# Patient Record
Sex: FEMALE | Race: WHITE | NOT HISPANIC OR LATINO | Employment: OTHER | URBAN - NONMETROPOLITAN AREA
[De-identification: names, ages, dates, MRNs, and addresses within clinical notes are randomized per-mention and may not be internally consistent; named-entity substitution may affect disease eponyms.]

---

## 2017-01-05 ENCOUNTER — OFFICE VISIT (OUTPATIENT)
Dept: RADIATION ONCOLOGY | Facility: HOSPITAL | Age: 82
End: 2017-01-05

## 2017-01-05 ENCOUNTER — HOSPITAL ENCOUNTER (OUTPATIENT)
Dept: RADIATION ONCOLOGY | Facility: HOSPITAL | Age: 82
Setting detail: RADIATION/ONCOLOGY SERIES
End: 2017-01-05

## 2017-01-05 VITALS
WEIGHT: 112 LBS | BODY MASS INDEX: 19.84 KG/M2 | HEIGHT: 63 IN | DIASTOLIC BLOOD PRESSURE: 70 MMHG | SYSTOLIC BLOOD PRESSURE: 140 MMHG

## 2017-01-05 DIAGNOSIS — C50.211 MALIGNANT NEOPLASM OF UPPER-INNER QUADRANT OF RIGHT FEMALE BREAST (HCC): Primary | ICD-10-CM

## 2017-01-05 PROCEDURE — 77290 THER RAD SIMULAJ FIELD CPLX: CPT | Performed by: RADIOLOGY

## 2017-01-05 PROCEDURE — 77334 RADIATION TREATMENT AID(S): CPT | Performed by: RADIOLOGY

## 2017-01-05 RX ORDER — TRIAMTERENE AND HYDROCHLOROTHIAZIDE 37.5; 25 MG/1; MG/1
1 TABLET ORAL DAILY
Refills: 5 | COMMUNITY
Start: 2016-12-20

## 2017-01-12 ENCOUNTER — OFFICE VISIT (OUTPATIENT)
Dept: SURGERY | Age: 82
End: 2017-01-12

## 2017-01-12 VITALS — DIASTOLIC BLOOD PRESSURE: 78 MMHG | SYSTOLIC BLOOD PRESSURE: 130 MMHG | HEART RATE: 72 BPM

## 2017-01-12 DIAGNOSIS — Z98.890 STATUS POST RIGHT BREAST LUMPECTOMY: ICD-10-CM

## 2017-01-12 DIAGNOSIS — C50.211 MALIGNANT NEOPLASM OF UPPER-INNER QUADRANT OF RIGHT FEMALE BREAST (HCC): Primary | ICD-10-CM

## 2017-01-12 PROCEDURE — 99024 POSTOP FOLLOW-UP VISIT: CPT | Performed by: SURGERY

## 2017-01-16 PROCEDURE — 77334 RADIATION TREATMENT AID(S): CPT

## 2017-01-16 PROCEDURE — 77300 RADIATION THERAPY DOSE PLAN: CPT | Performed by: RADIOLOGY

## 2017-01-16 PROCEDURE — 77295 3-D RADIOTHERAPY PLAN: CPT | Performed by: RADIOLOGY

## 2017-01-19 ENCOUNTER — HOSPITAL ENCOUNTER (OUTPATIENT)
Dept: RADIATION ONCOLOGY | Facility: HOSPITAL | Age: 82
Discharge: HOME | End: 2017-01-19

## 2017-01-19 PROCEDURE — 77280 THER RAD SIMULAJ FIELD SMPL: CPT | Performed by: RADIOLOGY

## 2017-01-19 PROCEDURE — 77412 RADIATION TX DELIVERY LVL 3: CPT | Performed by: RADIOLOGY

## 2017-01-20 ENCOUNTER — HOSPITAL ENCOUNTER (OUTPATIENT)
Dept: RADIATION ONCOLOGY | Facility: HOSPITAL | Age: 82
Discharge: HOME | End: 2017-01-20

## 2017-01-20 PROCEDURE — 77417 THER RADIOLOGY PORT IMAGE(S): CPT | Performed by: RADIOLOGY

## 2017-01-20 PROCEDURE — 77412 RADIATION TX DELIVERY LVL 3: CPT | Performed by: RADIOLOGY

## 2017-01-23 ENCOUNTER — HOSPITAL ENCOUNTER (OUTPATIENT)
Dept: RADIATION ONCOLOGY | Facility: HOSPITAL | Age: 82
Setting detail: RADIATION/ONCOLOGY SERIES
Discharge: HOME | End: 2017-01-23

## 2017-01-23 PROCEDURE — 77412 RADIATION TX DELIVERY LVL 3: CPT | Performed by: RADIOLOGY

## 2017-01-24 ENCOUNTER — HOSPITAL ENCOUNTER (OUTPATIENT)
Dept: RADIATION ONCOLOGY | Facility: HOSPITAL | Age: 82
Setting detail: RADIATION/ONCOLOGY SERIES
Discharge: HOME | End: 2017-01-24

## 2017-01-24 PROCEDURE — 77412 RADIATION TX DELIVERY LVL 3: CPT | Performed by: RADIOLOGY

## 2017-01-25 ENCOUNTER — HOSPITAL ENCOUNTER (OUTPATIENT)
Dept: RADIATION ONCOLOGY | Facility: HOSPITAL | Age: 82
Discharge: HOME | End: 2017-01-25

## 2017-01-25 PROCEDURE — 77336 RADIATION PHYSICS CONSULT: CPT | Performed by: RADIOLOGY

## 2017-01-25 PROCEDURE — 77412 RADIATION TX DELIVERY LVL 3: CPT | Performed by: RADIOLOGY

## 2017-01-26 ENCOUNTER — HOSPITAL ENCOUNTER (OUTPATIENT)
Dept: RADIATION ONCOLOGY | Facility: HOSPITAL | Age: 82
Discharge: HOME | End: 2017-01-26

## 2017-01-26 PROCEDURE — 77412 RADIATION TX DELIVERY LVL 3: CPT | Performed by: RADIOLOGY

## 2017-01-27 ENCOUNTER — HOSPITAL ENCOUNTER (OUTPATIENT)
Dept: RADIATION ONCOLOGY | Facility: HOSPITAL | Age: 82
Setting detail: RADIATION/ONCOLOGY SERIES
Discharge: HOME | End: 2017-01-27

## 2017-01-27 PROCEDURE — 77412 RADIATION TX DELIVERY LVL 3: CPT | Performed by: RADIOLOGY

## 2017-01-27 PROCEDURE — 77417 THER RADIOLOGY PORT IMAGE(S): CPT | Performed by: RADIOLOGY

## 2017-01-30 ENCOUNTER — HOSPITAL ENCOUNTER (OUTPATIENT)
Dept: RADIATION ONCOLOGY | Facility: HOSPITAL | Age: 82
Discharge: HOME | End: 2017-01-30

## 2017-01-30 PROCEDURE — 77412 RADIATION TX DELIVERY LVL 3: CPT | Performed by: RADIOLOGY

## 2017-01-31 ENCOUNTER — HOSPITAL ENCOUNTER (OUTPATIENT)
Dept: RADIATION ONCOLOGY | Facility: HOSPITAL | Age: 82
Discharge: HOME | End: 2017-01-31

## 2017-01-31 PROCEDURE — 77412 RADIATION TX DELIVERY LVL 3: CPT | Performed by: RADIOLOGY

## 2017-02-01 ENCOUNTER — HOSPITAL ENCOUNTER (OUTPATIENT)
Dept: RADIATION ONCOLOGY | Facility: HOSPITAL | Age: 82
Discharge: HOME | End: 2017-02-01

## 2017-02-01 PROCEDURE — 77336 RADIATION PHYSICS CONSULT: CPT | Performed by: RADIOLOGY

## 2017-02-01 PROCEDURE — 77412 RADIATION TX DELIVERY LVL 3: CPT | Performed by: RADIOLOGY

## 2017-02-02 ENCOUNTER — HOSPITAL ENCOUNTER (OUTPATIENT)
Dept: RADIATION ONCOLOGY | Facility: HOSPITAL | Age: 82
Discharge: HOME | End: 2017-02-02

## 2017-02-02 PROCEDURE — 77412 RADIATION TX DELIVERY LVL 3: CPT | Performed by: RADIOLOGY

## 2017-02-03 ENCOUNTER — HOSPITAL ENCOUNTER (OUTPATIENT)
Dept: RADIATION ONCOLOGY | Facility: HOSPITAL | Age: 82
Setting detail: RADIATION/ONCOLOGY SERIES
Discharge: HOME | End: 2017-02-03

## 2017-02-03 PROCEDURE — 77412 RADIATION TX DELIVERY LVL 3: CPT | Performed by: RADIOLOGY

## 2017-02-03 PROCEDURE — 77417 THER RADIOLOGY PORT IMAGE(S): CPT | Performed by: RADIOLOGY

## 2017-02-06 ENCOUNTER — HOSPITAL ENCOUNTER (OUTPATIENT)
Dept: RADIATION ONCOLOGY | Facility: HOSPITAL | Age: 82
Setting detail: RADIATION/ONCOLOGY SERIES
End: 2017-02-06

## 2017-02-06 ENCOUNTER — HOSPITAL ENCOUNTER (OUTPATIENT)
Dept: RADIATION ONCOLOGY | Facility: HOSPITAL | Age: 82
Discharge: HOME | End: 2017-02-06

## 2017-02-06 PROCEDURE — 77412 RADIATION TX DELIVERY LVL 3: CPT | Performed by: RADIOLOGY

## 2017-02-07 ENCOUNTER — HOSPITAL ENCOUNTER (OUTPATIENT)
Dept: RADIATION ONCOLOGY | Facility: HOSPITAL | Age: 82
Setting detail: RADIATION/ONCOLOGY SERIES
Discharge: HOME | End: 2017-02-07

## 2017-02-07 PROCEDURE — 77412 RADIATION TX DELIVERY LVL 3: CPT | Performed by: RADIOLOGY

## 2017-02-08 ENCOUNTER — HOSPITAL ENCOUNTER (OUTPATIENT)
Dept: RADIATION ONCOLOGY | Facility: HOSPITAL | Age: 82
Setting detail: RADIATION/ONCOLOGY SERIES
Discharge: HOME | End: 2017-02-08

## 2017-02-08 PROCEDURE — 77412 RADIATION TX DELIVERY LVL 3: CPT | Performed by: RADIOLOGY

## 2017-02-08 PROCEDURE — 77336 RADIATION PHYSICS CONSULT: CPT | Performed by: RADIOLOGY

## 2017-02-09 ENCOUNTER — HOSPITAL ENCOUNTER (OUTPATIENT)
Dept: RADIATION ONCOLOGY | Facility: HOSPITAL | Age: 82
Setting detail: RADIATION/ONCOLOGY SERIES
Discharge: HOME | End: 2017-02-09

## 2017-02-09 PROCEDURE — 77412 RADIATION TX DELIVERY LVL 3: CPT | Performed by: RADIOLOGY

## 2017-03-01 ENCOUNTER — OFFICE VISIT (OUTPATIENT)
Dept: SURGERY | Age: 82
End: 2017-03-01

## 2017-03-01 VITALS — SYSTOLIC BLOOD PRESSURE: 120 MMHG | DIASTOLIC BLOOD PRESSURE: 80 MMHG | HEART RATE: 84 BPM

## 2017-03-01 DIAGNOSIS — C50.211 BREAST CANCER OF UPPER-INNER QUADRANT OF RIGHT FEMALE BREAST (HCC): Primary | ICD-10-CM

## 2017-03-01 DIAGNOSIS — Z98.890 STATUS POST RIGHT BREAST LUMPECTOMY: ICD-10-CM

## 2017-03-01 PROCEDURE — G8400 PT W/DXA NO RESULTS DOC: HCPCS | Performed by: SURGERY

## 2017-03-01 PROCEDURE — 1123F ACP DISCUSS/DSCN MKR DOCD: CPT | Performed by: SURGERY

## 2017-03-01 PROCEDURE — G8419 CALC BMI OUT NRM PARAM NOF/U: HCPCS | Performed by: SURGERY

## 2017-03-01 PROCEDURE — G8484 FLU IMMUNIZE NO ADMIN: HCPCS | Performed by: SURGERY

## 2017-03-01 PROCEDURE — 99024 POSTOP FOLLOW-UP VISIT: CPT | Performed by: SURGERY

## 2017-03-01 PROCEDURE — 1090F PRES/ABSN URINE INCON ASSESS: CPT | Performed by: SURGERY

## 2017-03-01 PROCEDURE — 1036F TOBACCO NON-USER: CPT | Performed by: SURGERY

## 2017-03-01 PROCEDURE — G8428 CUR MEDS NOT DOCUMENT: HCPCS | Performed by: SURGERY

## 2017-03-01 PROCEDURE — 4040F PNEUMOC VAC/ADMIN/RCVD: CPT | Performed by: SURGERY

## 2017-03-01 RX ORDER — TAMOXIFEN CITRATE 20 MG/1
TABLET ORAL
Refills: 3 | COMMUNITY
Start: 2017-01-16 | End: 2019-08-19 | Stop reason: SDUPTHER

## 2017-03-06 ENCOUNTER — HOSPITAL ENCOUNTER (OUTPATIENT)
Dept: RADIATION ONCOLOGY | Facility: HOSPITAL | Age: 82
Setting detail: RADIATION/ONCOLOGY SERIES
End: 2017-03-06

## 2017-03-23 ENCOUNTER — OFFICE VISIT (OUTPATIENT)
Dept: RADIATION ONCOLOGY | Facility: HOSPITAL | Age: 82
End: 2017-03-23

## 2017-03-23 VITALS
HEIGHT: 63 IN | WEIGHT: 111 LBS | DIASTOLIC BLOOD PRESSURE: 74 MMHG | SYSTOLIC BLOOD PRESSURE: 136 MMHG | BODY MASS INDEX: 19.67 KG/M2

## 2017-03-23 DIAGNOSIS — Z08 ENCOUNTER FOR FOLLOW-UP EXAMINATION AFTER COMPLETED TREATMENT FOR CANCER: ICD-10-CM

## 2017-03-23 DIAGNOSIS — C50.211 MALIGNANT NEOPLASM OF UPPER-INNER QUADRANT OF RIGHT FEMALE BREAST (HCC): Primary | ICD-10-CM

## 2017-03-23 DIAGNOSIS — Z92.3 HISTORY OF RADIATION THERAPY: ICD-10-CM

## 2017-03-23 DIAGNOSIS — Z98.890 S/P LUMPECTOMY, RIGHT BREAST: ICD-10-CM

## 2017-03-23 RX ORDER — TAMOXIFEN CITRATE 20 MG/1
20 TABLET ORAL DAILY
COMMUNITY

## 2017-04-03 ENCOUNTER — TELEPHONE (OUTPATIENT)
Dept: SURGERY | Age: 82
End: 2017-04-03

## 2017-05-31 ENCOUNTER — OFFICE VISIT (OUTPATIENT)
Dept: SURGERY | Age: 82
End: 2017-05-31
Payer: MEDICARE

## 2017-05-31 ENCOUNTER — HOSPITAL ENCOUNTER (OUTPATIENT)
Dept: WOMENS IMAGING | Age: 82
Discharge: HOME | End: 2017-05-31
Payer: MEDICARE

## 2017-05-31 VITALS
HEIGHT: 62 IN | SYSTOLIC BLOOD PRESSURE: 120 MMHG | DIASTOLIC BLOOD PRESSURE: 70 MMHG | HEART RATE: 72 BPM | BODY MASS INDEX: 20.24 KG/M2 | WEIGHT: 110 LBS

## 2017-05-31 DIAGNOSIS — C50.211 BREAST CANCER OF UPPER-INNER QUADRANT OF RIGHT FEMALE BREAST (HCC): ICD-10-CM

## 2017-05-31 DIAGNOSIS — Z98.890 STATUS POST RIGHT BREAST LUMPECTOMY: Primary | ICD-10-CM

## 2017-05-31 DIAGNOSIS — Z98.890 STATUS POST RIGHT BREAST LUMPECTOMY: ICD-10-CM

## 2017-05-31 PROCEDURE — 1090F PRES/ABSN URINE INCON ASSESS: CPT | Performed by: SURGERY

## 2017-05-31 PROCEDURE — G8419 CALC BMI OUT NRM PARAM NOF/U: HCPCS | Performed by: SURGERY

## 2017-05-31 PROCEDURE — G8400 PT W/DXA NO RESULTS DOC: HCPCS | Performed by: SURGERY

## 2017-05-31 PROCEDURE — G8427 DOCREV CUR MEDS BY ELIG CLIN: HCPCS | Performed by: SURGERY

## 2017-05-31 PROCEDURE — 1123F ACP DISCUSS/DSCN MKR DOCD: CPT | Performed by: SURGERY

## 2017-05-31 PROCEDURE — 1036F TOBACCO NON-USER: CPT | Performed by: SURGERY

## 2017-05-31 PROCEDURE — 99213 OFFICE O/P EST LOW 20 MIN: CPT | Performed by: SURGERY

## 2017-05-31 PROCEDURE — G0206 DX MAMMO INCL CAD UNI: HCPCS

## 2017-05-31 PROCEDURE — 4040F PNEUMOC VAC/ADMIN/RCVD: CPT | Performed by: SURGERY

## 2017-06-08 DIAGNOSIS — C50.211 BREAST CANCER OF UPPER-INNER QUADRANT OF RIGHT FEMALE BREAST (HCC): Primary | ICD-10-CM

## 2017-06-08 DIAGNOSIS — Z98.890 STATUS POST RIGHT BREAST LUMPECTOMY: ICD-10-CM

## 2017-09-14 ENCOUNTER — TELEPHONE (OUTPATIENT)
Dept: SURGERY | Age: 82
End: 2017-09-14

## 2017-09-27 ENCOUNTER — HOSPITAL ENCOUNTER (OUTPATIENT)
Dept: RADIATION ONCOLOGY | Facility: HOSPITAL | Age: 82
Setting detail: RADIATION/ONCOLOGY SERIES
End: 2017-09-27

## 2017-09-27 ENCOUNTER — OFFICE VISIT (OUTPATIENT)
Dept: RADIATION ONCOLOGY | Facility: HOSPITAL | Age: 82
End: 2017-09-27

## 2017-09-27 VITALS
WEIGHT: 109 LBS | SYSTOLIC BLOOD PRESSURE: 120 MMHG | DIASTOLIC BLOOD PRESSURE: 64 MMHG | HEIGHT: 63 IN | BODY MASS INDEX: 19.31 KG/M2

## 2017-09-27 DIAGNOSIS — Z92.3 HISTORY OF RADIATION THERAPY: ICD-10-CM

## 2017-09-27 DIAGNOSIS — Z08 ENCOUNTER FOR FOLLOW-UP SURVEILLANCE OF BREAST CANCER: ICD-10-CM

## 2017-09-27 DIAGNOSIS — Z85.3 ENCOUNTER FOR FOLLOW-UP SURVEILLANCE OF BREAST CANCER: ICD-10-CM

## 2017-09-27 DIAGNOSIS — C50.211 MALIGNANT NEOPLASM OF UPPER-INNER QUADRANT OF RIGHT FEMALE BREAST (HCC): Primary | ICD-10-CM

## 2017-09-27 DIAGNOSIS — Z98.890 S/P LUMPECTOMY, RIGHT BREAST: ICD-10-CM

## 2017-09-27 DIAGNOSIS — Z17.0 ESTROGEN RECEPTOR POSITIVE: ICD-10-CM

## 2017-09-27 DIAGNOSIS — Z91.89 AT RISK FOR LYMPHEDEMA: ICD-10-CM

## 2017-09-27 PROCEDURE — G0463 HOSPITAL OUTPT CLINIC VISIT: HCPCS | Performed by: RADIOLOGY

## 2017-09-27 NOTE — PROGRESS NOTES
[facility] Emilee Scott BSN, PA-C  ____________________________________________________________  [facility]  Department of Radiation Oncology  [address] [place] [id number]  Office:  [phone]  Fax: [phone]        DATE: 09/27/2017    PATIENT:   Nella Avalos    1934                                 MEDICAL RECORD #:  [id number]                                                       Reason for Visit:    1. Malignant neoplasm of upper-inner quadrant of right female breast    2. S/P lumpectomy, right breast    3. History of radiation therapy- RIght Breast 2/2017    4. Estrogen receptor positive    5. At risk for lymphedema    6. Encounter for follow-up surveillance of breast cancer          BRIEF HISTORY:  Nella Avalos is a very pleasant  82 y.o. female that is status post right breast lumpectomy and radiation therapy for Stage IA (T1c, N0, M0) invasive mammary breast cancer with completion on 02/09/2017. The patient returns to the clinic today for initial follow up evaluation.  The patient is doing well today with no new significant treatment or disease related complaints.  On Tamoxifen and tolerating it well. Mrs. Avalos continues to follow with Dr. Thomason and Dr. Rivero. Pt will see Dr. Rivero again in November with mammogram.  Mammogram completed on 5/31/2017 and was normal.     ONCOLOGY HISTORY      Malignant neoplasm of upper-inner quadrant of right female breast     Initial Diagnosis     Malignant neoplasm of upper-inner quadrant of right female breast         10/28/2016 Imaging     Bilateral Diagnostic Mammogram/Right Breast US:  Patient's palpable abnormality coincides with an oval 1.6cm nodule on ultrasound with internal echoes; this could represent a nonsimple cyst or possibly a solid lesion and further evaluation with aspiration/biopsy should be considered         11/8/2016 Procedure     FINAL DIAGNOSIS:    Breast,  right 2 o'clock mass, biopsy:  Invasive ductal carcinoma, favor intermediate grade  Tumor size at least 11 mm in greatest linear dimension         11/16/2016 Imaging     MRI Bilateral Breasts:  1. A 3.7 cm postop hematoma posteromedial breast with mild edema  changes extending in the pectoralis muscle. Focal peripheral  enhancement associated with this abnormality is described above.  2. No MR evidence of additional breast lesions or definite  lymphadenopathy.         12/2/2016 Surgery     FINAL DIAGNOSIS:    A. Breast, right lumpectomy:   1.  Previous biopsy site identified with residual low-grade ductal  carcinoma in situ.   2.  Negative for evidence of residual invasive carcinoma.   3.  In situ carcinoma extends to within 0.1 cm of the caudal surgical  excision margin.    B.  Breast, reexcision of right breast additional cranial margin: Benign  breast parenchyma.    C.  Lymph nodes, right axillary sentinel lymph node biopsies: 2 lymph  nodes, negative for evidence of malignancy.    AJCC stage:  pT1b, (sn)pN0, pMx (incorporates data from patient's  previous surgical pathology specimen)      INVASIVE CARCINOMA OF THE BREAST: (incorporates data from the patient's  previous surgical pathology specimen)    SPECIMEN:    Procedure:    Excision without wire-guided localization  Lymph Node Sampling:    [place] lymph node(s)  Specimen Laterality:  Right  TUMOR:    Ductal Carcinoma In Situ (DCIS):  DCIS is present.    Negative for extensive intraductal component (EIC).   Architectural Patterns:  Cribriform, Solid   Nuclear Grade:  Grade I (low).  Necrosis:  Not identified  Lobular Carcinoma In Situ (LCIS):  Not identified  Presence of Invasive Carcinoma:  Histologic Type of Invasive Carcinoma:  Invasive ductal carcinoma (no  special type or not otherwise specified).  Histologic Grade: Zoë Histologic Score:    Glandular (Acinar) / Tubular Differentiation:  Score cannot be  determined    Nuclear Pleomorphism:  Score  cannot be determined    Mitotic Rate:   Score cannot be determined    Overall Grade:  Score cannot be determined  EXTENT:    Tumor Size / Focality:    Tumor Size: Size of Largest Invasive Carcinoma:   Greatest dimension of  largest focus of invasion > 0.1 c:  1.1 cm  Tumor Focality : Single focus of invasive carcinoma  Macroscopic and Microscopic Extent of Tumor:    Skin is not present    Skeletal Muscle:  No skeletal muscle is present.  MARGINS:      Margins uninvolved by invasive carcinoma.    Distance from Closest Margin:    Specify:  at least 0.1 cm  Ductal Carcinoma In Situ (DCIS)    Margins uninvolved by DCIS (DCIS present)    Distance of DCIS from Closest Margin:      Specify:  0.1 cm      Closest Uninvolved Margin:  Inferior  ACCESSORY FINDINGS:    Lymph-Vascular Invasion:  Not identified  Dermal Lymph-Vascular Invasion:  No skin present  Treatment Effect: Response to Presurgical (Neoadjuvant) Therapy:    No known presurgical therapy  LYMPH NODES:    [place] and Non-[place] Nodes:    Total Number of Lymph Nodes Examined (sentinel and nonsentinel):  2.     Number of Lymph Nodes without Tumor Cells Identified:  2 .     Number of Lymph Nodes with Isolated Tumor Cells (<= 0.2 mm and <= 200  cells):  0 .  Micro / Macro Metastases:    Not identified  SPECIAL STUDIES:    PATHOLOGIC STAGING:    Primary Tumor (Invasive Carcinoma) (pT):  pT1b:  Tumor > 5 mm but <= 10  mm in greatest dimension  Regional Lymph Nodes (pN):  (sn): Only sentinel node(s) evaluated.  If 6  or more nodes (sentinel or nonsentinel) are removed, this modifier should  not be used.Category (pN):  pN0: No regional lymph node metastasis  identified histologically  Distant Metastasis (pM):  Not applicable  ADDITIONAL NON-TUMOR FINDINGS:         1/19/2017 - 2/9/2017 Radiation     Radiation OncologyTreatment Course:  Nella Avalos received 4256 cGy in 16 fractions to right breast.         5/31/2017 Imaging     Right Mammogram:  1. Postsurgical  changes with no malignant features.  2. This represents a BI-RADS Category 2 exam.  3. Bilateral mammography is recommended in 6 months to place this  patient back on annual schedule.          History obtained from  PATIENT and CHART     PAST MEDICAL HISTORY   Past Medical History:   Diagnosis Date   • Breast cancer    • COPD (chronic obstructive pulmonary disease)    • History of radiation therapy 02/09/2017    4256 cGy to right breast   • Hypertension       PAST SURGICAL HISTORY   Past Surgical History:   Procedure Laterality Date   • BREAST BIOPSY Right 11/08/2016   • BREAST LUMPECTOMY WITH SENTINEL NODE BIOPSY Right 12/05/2016   • HYSTERECTOMY        SOCIAL HISTORY   Social History   Substance Use Topics   • Smoking status: Former Smoker     Types: Cigarettes     Quit date: 2001   • Smokeless tobacco: None   • Alcohol use Yes      Comment: rarely      ALLERGIES   Sulfa antibiotics     MEDICATIONS     Current Outpatient Prescriptions   Medication Sig Dispense Refill   • albuterol (PROAIR HFA) 108 (90 BASE) MCG/ACT inhaler Inhale 2 puffs Every 4 (Four) Hours As Needed for wheezing.     • Biotin 5 MG capsule Take 1 tablet by mouth Daily.     • fluticasone-salmeterol (ADVAIR DISKUS) 250-50 MCG/DOSE DISKUS Inhale 1 puff 2 (Two) Times a Day.     • latanoprost (XALATAN) 0.005 % ophthalmic solution Administer 1 drop to both eyes Every Night.     • Multiple Vitamins-Minerals (MULTIVITAMIN ADULT PO) Take 1 tablet by mouth Daily.     • Potassium 99 MG tablet Take  by mouth.     • tamoxifen (NOLVADEX) 20 MG chemo tablet Take 20 mg by mouth Daily.     • timolol (TIMOPTIC) 0.25 % ophthalmic solution Administer 1 drop to both eyes 2 (Two) Times a Day.     • tiotropium (SPIRIVA HANDIHALER) 18 MCG per inhalation capsule Place 1 capsule into inhaler and inhale Daily.     • triamterene-hydrochlorothiazide (MAXZIDE-25) 37.5-25 MG per tablet Take 1 tablet by mouth Daily. Half a pill once a day  5   • zolpidem (AMBIEN) 5 MG tablet  "Take 5 mg by mouth At Night As Needed for sleep.       No current facility-administered medications for this visit.         The following portions of the patient's history were reviewed and updated as appropriate: allergies, current medications, past family history, past medical history, past social history, past surgical history and problem list.    REVIEW OF SYSTEMS   Review of Systems   HENT: Negative.    Eyes: Negative.         Glaucoma  Cataract surgery left eye     Respiratory: Negative.         COPD   Cardiovascular: Negative.    Gastrointestinal: Negative.    Endocrine:        No hot flashes  Tamoxifen   Genitourinary: Negative.    Musculoskeletal: Negative.    Skin: Negative.    Allergic/Immunologic: Negative.    Hematological: Negative.    Psychiatric/Behavioral: Negative.        PHYSICAL EXAM   VITAL SIGNS:   Vitals:    09/27/17 0935   BP: 120/64   Weight: 109 lb (49.4 kg)   Height: 62.5\" (158.8 cm)   PainSc: 0-No pain   Body mass index is 19.62 kg/(m^2).    General:  Alert and oriented, appears stated age, cooperative and no acute distress. Vitals reviewed.     HEENT:  Normocephalic, atraumatic, conjunctivae/corneas clear. PERRL, EOM's intact. Normal external ear canals bilaterally   Nares normal. Nasal septum midline. No drainage or sinus tenderness.  lips, mucosa, and tongue normal; teeth and gums normal.  Neck supple, no JVD noted. Thyroid ML, not enlarged, no tenderness/mass/nodules      Heart: regular rate and rhythm, S1, S2 normal, no murmur, click, rub or gallop     Lungs: Thoracic dimension normal, lungs clear to auscultation bilaterally     Breasts: right breast non-tender, status post radiation therapy, without mass, skin or nipple changes or axillary nodes, left breast normal without mass, skin or nipple changes or axillary nodes, surgical scars noted right breast, risk and benefit of breast self-exam was discussed.     Abdomen: soft, non-tender; bowel sounds normal; no masses,  no " organomegaly.  No CVA tenderness.     Extremities: ELAINE well, atraumatic, no cyanosis or edema. Pulse 2+ and symmetric.     Skin: Skin color, texture, turgor normal. No rashes or lesions     Lymph nodes: Cervical, supraclavicular, and axillary nodes normal.     Neurologic: Alert and oriented X 3, normal strength and tone. Normal symmetric reflexes. Normal coordination and gait     Psychiatric: Mood and affect are appropriate.    PERTINENT LABS and IMAGING  No results found for any previous visit.      No Images in the past 120 days found..    Clinical Quality Measures             Pain Documented PQRS #131 Pain severity quantified; no pain present, no followup plan required.             Care Plan: ADVANCED CARE PQRS #47 Care plan discussed, no care plan provided             Performance Status: ECOG (1) Restricted in physically strenuous activity, ambulatory and able to do work of light nature             TOBACCO SCREENING AND INTERVENTION  PQRS #226 Screened & identified as tobacco non-user. Former smoker               Medications Documented PQRS #130 Medications documented              WEIGHT SCREENING/BMI  Calculated BMI within normal parameteres & documented     BREAST PQRS #71   Stage IC-IIIC, ER+ or AL+: Yes  HORMONAL TX ()  Pt is taking  STAGE ()  IA  ER/AL Status: ER or AL positive       ASSESSMENT AND PLAN   1. Malignant neoplasm of upper-inner quadrant of right female breast    2. S/P lumpectomy, right breast    3. History of radiation therapy- RIght Breast 2/2017    4. Estrogen receptor positive    5. At risk for lymphedema    6. Encounter for follow-up surveillance of breast cancer       No orders of the defined types were placed in this encounter.      RECOMMENDATIONS:  Nella Avalos completed radiation on 2-9-17 for  Stage IA (T1c, N0, M0) invasive mammary breast cancer and presents to our clinic today without symptoms or evidence on exam for recurrent or metastatic disease at this time.   Return in about 1 year (around 9/27/2018) for Office Visit, re-evaluation and examination.  Patient Instructions   1. Continue Tamoxifen  2. Keep appts with Dr. Thomason and Dr Rivero  3. Return to see Dr. Sharma in one year  4. Don't forget your monthly breast exams  5. Please call us if you need us.         Today, total time spent with this patient was 20 minutes and of that time, well over 50% was spent in counseling and coordination of care as follows: diagnosis, post-treatment coordination of care and radiation therapy in general  Emilee Avila PA-C for Dr. David Sharma  09/27/2017

## 2017-09-27 NOTE — PATIENT INSTRUCTIONS
1. Continue Tamoxifen  2. Keep appts with Dr. Thomason and Dr Rivero  3. Return to see Dr. Sharma in one year  4. Don't forget your monthly breast exams  5. Please call us if you need us.

## 2017-11-22 ENCOUNTER — OFFICE VISIT (OUTPATIENT)
Dept: SURGERY | Age: 82
End: 2017-11-22
Payer: MEDICARE

## 2017-11-22 ENCOUNTER — HOSPITAL ENCOUNTER (OUTPATIENT)
Dept: WOMENS IMAGING | Age: 82
Discharge: HOME | End: 2017-11-22
Payer: MEDICARE

## 2017-11-22 VITALS
HEIGHT: 62 IN | DIASTOLIC BLOOD PRESSURE: 70 MMHG | SYSTOLIC BLOOD PRESSURE: 110 MMHG | BODY MASS INDEX: 20.61 KG/M2 | WEIGHT: 112 LBS | HEART RATE: 72 BPM

## 2017-11-22 DIAGNOSIS — Z85.3 PERSONAL HISTORY OF MALIGNANT NEOPLASM OF BREAST: Primary | ICD-10-CM

## 2017-11-22 DIAGNOSIS — Z98.890 STATUS POST RIGHT BREAST LUMPECTOMY: ICD-10-CM

## 2017-11-22 DIAGNOSIS — C50.211 BREAST CANCER OF UPPER-INNER QUADRANT OF RIGHT FEMALE BREAST (HCC): ICD-10-CM

## 2017-11-22 PROCEDURE — 99212 OFFICE O/P EST SF 10 MIN: CPT | Performed by: PHYSICIAN ASSISTANT

## 2017-11-22 PROCEDURE — G8420 CALC BMI NORM PARAMETERS: HCPCS | Performed by: PHYSICIAN ASSISTANT

## 2017-11-22 PROCEDURE — G8427 DOCREV CUR MEDS BY ELIG CLIN: HCPCS | Performed by: PHYSICIAN ASSISTANT

## 2017-11-22 PROCEDURE — 1123F ACP DISCUSS/DSCN MKR DOCD: CPT | Performed by: PHYSICIAN ASSISTANT

## 2017-11-22 PROCEDURE — 4040F PNEUMOC VAC/ADMIN/RCVD: CPT | Performed by: PHYSICIAN ASSISTANT

## 2017-11-22 PROCEDURE — G8484 FLU IMMUNIZE NO ADMIN: HCPCS | Performed by: PHYSICIAN ASSISTANT

## 2017-11-22 PROCEDURE — G8400 PT W/DXA NO RESULTS DOC: HCPCS | Performed by: PHYSICIAN ASSISTANT

## 2017-11-22 PROCEDURE — 1090F PRES/ABSN URINE INCON ASSESS: CPT | Performed by: PHYSICIAN ASSISTANT

## 2017-11-22 PROCEDURE — 1036F TOBACCO NON-USER: CPT | Performed by: PHYSICIAN ASSISTANT

## 2017-11-22 PROCEDURE — G0279 TOMOSYNTHESIS, MAMMO: HCPCS

## 2017-12-03 NOTE — PROGRESS NOTES
HPI:  Kip Rowe is in for follow-up breast check following 12-2-1016. She has not noticed any changes in her breasts. She completed radiation. EXAMINATION: [facility] DIGITAL DIAGNOSTIC W OR WO CAD BILATERAL 11/22/2017   1:38 PM   HISTORY: Annual exam. Personal history of breast cancer status post   right lumpectomy with radiation therapy in 2016. FINDINGS:   Bilateral digital CC and MLO views obtained in addition to a lateral   magnification view of the lumpectomy site. Sy Cedillo is made to   exams dated 5/31/2017, 10/28/2016, and 11/5/2015. 3-D tomosynthesis   views also obtained.  Computer-aided detection technology was utilized   for assessment of this examination. The breast parenchyma is heterogeneously dense, which may obscure   small masses. Surgical clips are seen in the lower inner right breast,   far posterior depth, compatible with previous lumpectomy. Surgical   clips are also seen in the right axilla. Magnification views over the   lumpectomy site demonstrate no suspicious microcalcifications. The   lumpectomy site is far posterior and is difficult to completely   included on the images despite multiple attempts. There is skin   thickening of the right breast, compatible with history of radiation   therapy. No new or dominant mass, unexpected architectural distortion,   or suspicious appearing microcalcifications within either breast.       Impression   1.  No mammographic evidence of malignancy within either breast. 12   month follow-up mammogram recommended. 2.  BI-RADS category 2. BREAST EXAM:  On examination, she has fibrocystic changes throughout both breasts, no dominant masses, no skin or nipple changes and no axillary adenopathy. I see nothing suspicious for breast cancer. She has appropriate postoperative and postradiation changes on the right.     ASSESSMENT:  Normal breast exam and mammogram    PLAN:  I will plan to see her back in 1 year for physical exam and bilateral mammograms. She will contact me if anything significant changes.

## 2018-09-20 ENCOUNTER — TELEPHONE (OUTPATIENT)
Dept: SURGERY | Age: 83
End: 2018-09-20

## 2018-10-15 PROBLEM — Z87.891 FORMER SMOKER: Status: ACTIVE | Noted: 2018-10-15

## 2018-10-15 NOTE — PROGRESS NOTES
[facility]  MD Emilee Richter BSN, PA-C  ____________________________________________________________  [facility]  Department of Radiation Oncology  [address] [place] [id number]  Office:  [phone]  Fax: [phone]    DATE: 10/17/2018    PATIENT: Nella Avalos    1934                                   MEDICAL RECORD #:  [id number]                                                       Reason for Visit:Malignant neoplasm of upper-inner quadrant of right breast in female, estrogen receptor positive (CMS/HCC) [C50.211, Z17.0]    BRIEF HISTORY: Nella Avalos is a pleasant 83 y.o. patient that returns to the clinic today for routine follow up exam.  is doing well with no new significant treatment or disease related complaints. Reports appetite change (decrease), fatigue, and cough. Continues on Tamoxifen with no side effects reported. Denies unexpected weight change, nausea/vomiting, diarrhea, and pain/discomfort. Continues to follow  and .     History of Present Illness:  Nella Avalos is status post radiation therapy for clinical Stage IA (T1c, N0, M0) invasive mammary breast cancer with completion on 02/09/2017    10/28/2016 - Bilateral Diagnostic Mammogram/Right Breast US:  • Patient's palpable abnormality coincides with an oval 1.6cm nodule on ultrasound with internal echoes; this could represent a nonsimple cyst or possibly a solid lesion and further evaluation with aspiration/biopsy should be considered    11/08/2016 - Breast, right 2 o'clock mass, biopsy:  • Invasive ductal carcinoma, favor intermediate grade  • Tumor size at least 11 mm in greatest linear dimension    11/16/2016 - MRI Bilateral Breasts:  • A 3.7 cm postop hematoma posteromedial breast with mild edema changes extending in the pectoralis muscle. Focal peripheral enhancement associated with this abnormality is described above.  • No MR  evidence of additional breast lesions or definite lymphadenopathy.    12/02/2016 - Right breast lumpectomy and lymph node biopsy:  Breast, right lumpectomy:  • Previous biopsy site identified with residual low-grade ductal carcinoma in situ.  • Negative for evidence of residual invasive carcinoma.  • In situ carcinoma extends to within 0.1 cm of the caudal surgical excision margin.  Breast, reexcision of right breast additional cranial margin:   • Benign breast parenchyma.  Lymph nodes, right axillary sentinel lymph node biopsies:   • 2 lymph nodes, negative for evidence of malignancy.    01/19/2017 - 02/09/2017 - Completed Radiation therapy:  • Received 4256 cGy in 16 fractions to right breast.    05/31/2017 - Right Mammogram:  • Postsurgical changes with no malignant features.  • This represents a BI-RADS Category 2 exam.  • Bilateral mammography is recommended in 6 months to place this patient back on annual schedule.    09/27/2017 - Appointment with :  • Follow up in one year     11/22/2017 - Bilateral mammogram:  • No mammographic evidence of malignancy within either breast. 12 month follow-up mammogram recommended.  • BI-RADS category 2.    11/22/2017 - Appointment with :  • Follow up in one year for physical exam and bilateral mammograms.    History obtained from  PATIENT and CHART    PAST MEDICAL HISTORY   Past Medical History:   Diagnosis Date   • Breast cancer (CMS/HCC)    • COPD (chronic obstructive pulmonary disease) (CMS/HCC)    • History of radiation therapy 02/09/2017    4256 cGy to right breast   • Hypertension       PAST SURGICAL HISTORY   Past Surgical History:   Procedure Laterality Date   • BREAST BIOPSY Right 11/08/2016   • BREAST LUMPECTOMY WITH SENTINEL NODE BIOPSY Right 12/05/2016   • HYSTERECTOMY        FAMILY HISTORY  family history is not on file.     SOCIAL HISTORY   Social History   Substance Use Topics   • Smoking status: Former Smoker     Types: Cigarettes     Quit  date: 2001   • Smokeless tobacco: Never Used   • Alcohol use Yes      Comment: rarely      ALLERGIES  Sulfa antibiotics     MEDICATIONS   Current Outpatient Prescriptions   Medication Sig Dispense Refill   • albuterol (PROAIR HFA) 108 (90 BASE) MCG/ACT inhaler Inhale 2 puffs Every 4 (Four) Hours As Needed for wheezing.     • Biotin 5 MG capsule Take 1 tablet by mouth Daily.     • fluticasone-salmeterol (ADVAIR DISKUS) 250-50 MCG/DOSE DISKUS Inhale 1 puff 2 (Two) Times a Day.     • latanoprost (XALATAN) 0.005 % ophthalmic solution Administer 1 drop to both eyes Every Night.     • Multiple Vitamins-Minerals (MULTIVITAMIN ADULT PO) Take 1 tablet by mouth Daily.     • Potassium 99 MG tablet Take  by mouth.     • tamoxifen (NOLVADEX) 20 MG chemo tablet Take 20 mg by mouth Daily.     • timolol (TIMOPTIC) 0.25 % ophthalmic solution Administer 1 drop to both eyes 2 (Two) Times a Day.     • tiotropium (SPIRIVA HANDIHALER) 18 MCG per inhalation capsule Place 1 capsule into inhaler and inhale Daily.     • triamterene-hydrochlorothiazide (MAXZIDE-25) 37.5-25 MG per tablet Take 1 tablet by mouth Daily. Half a pill once a day  5   • zolpidem (AMBIEN) 5 MG tablet Take 5 mg by mouth At Night As Needed for sleep.       No current facility-administered medications for this visit.       The following portions of the patient's history were reviewed and updated as appropriate: allergies, current medications, past family history, past medical history, past social history, past surgical history and problem list.    REVIEW OF SYSTEMS  Review of Systems   Constitutional: Positive for appetite change (poor appetite) and fatigue. Negative for unexpected weight change.   HENT: Negative.    Eyes: Negative.    Respiratory: Positive for cough.    Cardiovascular: Negative.    Gastrointestinal: Negative.    Endocrine: Negative.    Genitourinary: Negative.    Musculoskeletal: Negative.    Skin: Negative.    Allergic/Immunologic: Negative.   "  Neurological: Negative.    Hematological: Negative.    Psychiatric/Behavioral: Negative.      PHYSICAL EXAM  VITAL SIGNS:   Vitals:    10/17/18 0933   BP: 118/66   Weight: 48.5 kg (107 lb)   Height: 158.8 cm (62.5\")   PainSc: 0-No pain       General: awake, alert, oriented, in no acute distress, well developed, well nourished, alert and cooperative Vitals reviewed.  Head/Neck:  Normocephalic, without obvious abnormality, atraumatic.  The trachea is midline. Neck is supple without evidence of cervical adenopathy.   Nose/Sinuses:  Nares normal. Septum midline. Mucosa normal. No drainage or sinus tenderness.  Mouth/Throat:  Mucosa moist, no lesions; pharynx without erythema, edema or exudate.  Neck:  neck- supple, no mass, non-tender  Eyes: No gross abnormalities., PERRL and Sclera nonicteric no scleral jaundice or erythema.    Ears: Ears appear intact with no abnormalities noted, hearing grossly normal  Chest:  Normal expansion.Respiratory efforts are normal and unlabored, chest is clear to auscultation. There are no wheezes, rhonchi, rales.  Cardiovascular: Regular rate and rhythm without murmurs, rubs, or gallops.   Abdomen:  Soft, non-tender, normal bowel sounds; no bruits, organomegaly or masses. no CVA tenderness   Breast: s/p radiation therapy to right breast normal without mass, skin or nipple changes or axillary nodes, left breast normal without mass, skin or nipple changes or axillary nodes, surgical scars noted right breast, risk and benefit of breast self-exam was discussed.  Extremities:  ELAINE well, warm to touch,no evidence of cyanosis or edema.  Lymphatics:  No evidence of adenopathy in the cervical, supraclavicular or axillaryareas.  Skin: No suspicious lesions or rashes of concern, skin color, texture, turgor are normal  Neurologic:  Alert and oriented x 3, gait normal., reflexes normal and symmetric, strength and sensation grossly normal  Psych: Mood and affect are appropriate for circumstance. Eye " contact is appropriate.     Performance Status: ECOG (0) Fully active, able to carry on all predisease performance without restriction    Clinical Quality Measures  -Pain Documented  Pain severity quantified; no pain present, no followup plan required.0   -Advanced Care Planning Care plan discussed, no care plan provided  -Body Mass Index Screening and Follow-Up Plan Body mass index is 19.26 kg/m². Patient's Body mass index is 19.26 kg/m². BMI is within normal parameters. No follow-up required.  -Pneumonia Vaccine: Received Injection?  NO- I am recommending patient to see PCP for annual wellness exam   -Tobacco Use: Screening and Cessation Intervention Smoking status: Former Smoker                                                              Packs/day: 0.00      Years: 0.00         Types: Cigarettes     Quit date: 2001  Smokeless tobacco: Never Used                        BREAST   Stage IC-IIIC, ER+ or OR+: yes  Endocrine Therapy? TMX/AI Stg IC-IIIC: Is documented/Prescried  AJCC Stage: I (T1C)  ER/OR Status: ER or OR Positive    ASSESSMENT AND PLAN   1. Malignant neoplasm of upper-inner quadrant of right breast in female, estrogen receptor positive (CMS/HCC)    2. S/P lumpectomy, right breast    3. History of radiation therapy- RIght Breast 2/2017    4. Former smoker    5. Encounter for follow-up surveillance of breast cancer      RECOMMENDATIONS:  Nella Avalos presents to our clinic today after completion of radiation therapy for Stage IA (T1c, N0, M0) invasive mammary breast cancer, she is without symptoms or evidence for recurrent or metastatic disease at this time. Next mammogram is scheduled for November, 2018. Continue taking Tamoxifen under 's care.  She is to follow up with  as scheduled. Return to clinic for follow up appointment in one year or sooner if needed.     Patient Instructions   1. Return to Dr. Sharma in one year  2. Continue to follow with   3. Continue  Tamoxifen    Todays appointment time was spent in counseling and coordination of care as follows: diagnosis, intent of treatment discussing radiation therapy specifics: logistics, possible and probable side effects and after effects, staging of cancer, standard of care in for this stage of this cancer and treatment options.  I saw this patient in follow-up while covering for Dr. David Sharma, radiation oncologist.  Tomer Torres MD  10/17/2018

## 2018-10-17 ENCOUNTER — OFFICE VISIT (OUTPATIENT)
Dept: RADIATION ONCOLOGY | Facility: HOSPITAL | Age: 83
End: 2018-10-17

## 2018-10-17 ENCOUNTER — APPOINTMENT (OUTPATIENT)
Dept: RADIATION ONCOLOGY | Facility: HOSPITAL | Age: 83
End: 2018-10-17

## 2018-10-17 VITALS
BODY MASS INDEX: 18.96 KG/M2 | SYSTOLIC BLOOD PRESSURE: 118 MMHG | WEIGHT: 107 LBS | HEIGHT: 63 IN | DIASTOLIC BLOOD PRESSURE: 66 MMHG

## 2018-10-17 DIAGNOSIS — Z17.0 MALIGNANT NEOPLASM OF UPPER-INNER QUADRANT OF RIGHT BREAST IN FEMALE, ESTROGEN RECEPTOR POSITIVE (HCC): Primary | ICD-10-CM

## 2018-10-17 DIAGNOSIS — C50.211 MALIGNANT NEOPLASM OF UPPER-INNER QUADRANT OF RIGHT BREAST IN FEMALE, ESTROGEN RECEPTOR POSITIVE (HCC): Primary | ICD-10-CM

## 2018-10-17 DIAGNOSIS — Z87.891 FORMER SMOKER: ICD-10-CM

## 2018-10-17 DIAGNOSIS — Z85.3 ENCOUNTER FOR FOLLOW-UP SURVEILLANCE OF BREAST CANCER: ICD-10-CM

## 2018-10-17 DIAGNOSIS — Z92.3 HISTORY OF RADIATION THERAPY: ICD-10-CM

## 2018-10-17 DIAGNOSIS — Z08 ENCOUNTER FOR FOLLOW-UP SURVEILLANCE OF BREAST CANCER: ICD-10-CM

## 2018-10-17 DIAGNOSIS — Z98.890 S/P LUMPECTOMY, RIGHT BREAST: ICD-10-CM

## 2018-10-17 PROCEDURE — G0463 HOSPITAL OUTPT CLINIC VISIT: HCPCS | Performed by: RADIOLOGY

## 2018-12-10 ENCOUNTER — HOSPITAL ENCOUNTER (OUTPATIENT)
Dept: WOMENS IMAGING | Age: 83
Discharge: HOME | End: 2018-12-10
Payer: MEDICARE

## 2018-12-10 ENCOUNTER — OFFICE VISIT (OUTPATIENT)
Dept: SURGERY | Age: 83
End: 2018-12-10
Payer: MEDICARE

## 2018-12-10 VITALS — WEIGHT: 106 LBS | HEIGHT: 62 IN | BODY MASS INDEX: 19.51 KG/M2

## 2018-12-10 DIAGNOSIS — Z85.3 PERSONAL HISTORY OF MALIGNANT NEOPLASM OF BREAST: Primary | ICD-10-CM

## 2018-12-10 DIAGNOSIS — Z85.3 PERSONAL HISTORY OF MALIGNANT NEOPLASM OF BREAST: ICD-10-CM

## 2018-12-10 PROCEDURE — 99212 OFFICE O/P EST SF 10 MIN: CPT | Performed by: PHYSICIAN ASSISTANT

## 2018-12-10 PROCEDURE — 4004F PT TOBACCO SCREEN RCVD TLK: CPT | Performed by: PHYSICIAN ASSISTANT

## 2018-12-10 PROCEDURE — G8400 PT W/DXA NO RESULTS DOC: HCPCS | Performed by: PHYSICIAN ASSISTANT

## 2018-12-10 PROCEDURE — 1123F ACP DISCUSS/DSCN MKR DOCD: CPT | Performed by: PHYSICIAN ASSISTANT

## 2018-12-10 PROCEDURE — G8420 CALC BMI NORM PARAMETERS: HCPCS | Performed by: PHYSICIAN ASSISTANT

## 2018-12-10 PROCEDURE — 1090F PRES/ABSN URINE INCON ASSESS: CPT | Performed by: PHYSICIAN ASSISTANT

## 2018-12-10 PROCEDURE — 77066 DX MAMMO INCL CAD BI: CPT

## 2018-12-10 PROCEDURE — 4040F PNEUMOC VAC/ADMIN/RCVD: CPT | Performed by: PHYSICIAN ASSISTANT

## 2018-12-10 PROCEDURE — G8428 CUR MEDS NOT DOCUMENT: HCPCS | Performed by: PHYSICIAN ASSISTANT

## 2018-12-10 PROCEDURE — 1101F PT FALLS ASSESS-DOCD LE1/YR: CPT | Performed by: PHYSICIAN ASSISTANT

## 2018-12-10 PROCEDURE — G8484 FLU IMMUNIZE NO ADMIN: HCPCS | Performed by: PHYSICIAN ASSISTANT

## 2019-08-19 RX ORDER — TAMOXIFEN CITRATE 20 MG/1
TABLET ORAL
Qty: 30 TABLET | Refills: 1 | Status: SHIPPED | OUTPATIENT
Start: 2019-08-19 | End: 2019-11-15 | Stop reason: SDUPTHER

## 2019-10-28 PROBLEM — Z79.810 PROPHYLACTIC USE OF TAMOXIFEN: Status: ACTIVE | Noted: 2019-10-28

## 2019-10-30 ENCOUNTER — OFFICE VISIT (OUTPATIENT)
Dept: RADIATION ONCOLOGY | Facility: HOSPITAL | Age: 84
End: 2019-10-30

## 2019-10-30 ENCOUNTER — HOSPITAL ENCOUNTER (OUTPATIENT)
Dept: RADIATION ONCOLOGY | Facility: HOSPITAL | Age: 84
Setting detail: RADIATION/ONCOLOGY SERIES
End: 2019-10-30

## 2019-10-30 VITALS
HEIGHT: 63 IN | DIASTOLIC BLOOD PRESSURE: 64 MMHG | BODY MASS INDEX: 19.31 KG/M2 | WEIGHT: 109 LBS | SYSTOLIC BLOOD PRESSURE: 118 MMHG

## 2019-10-30 DIAGNOSIS — C50.211 MALIGNANT NEOPLASM OF UPPER-INNER QUADRANT OF RIGHT BREAST IN FEMALE, ESTROGEN RECEPTOR POSITIVE (HCC): Primary | ICD-10-CM

## 2019-10-30 DIAGNOSIS — Z92.3 HISTORY OF RADIATION THERAPY: ICD-10-CM

## 2019-10-30 DIAGNOSIS — Z87.891 FORMER SMOKER: ICD-10-CM

## 2019-10-30 DIAGNOSIS — Z17.0 MALIGNANT NEOPLASM OF UPPER-INNER QUADRANT OF RIGHT BREAST IN FEMALE, ESTROGEN RECEPTOR POSITIVE (HCC): Primary | ICD-10-CM

## 2019-10-30 DIAGNOSIS — Z79.810 PROPHYLACTIC USE OF TAMOXIFEN: ICD-10-CM

## 2019-10-30 DIAGNOSIS — Z98.890 S/P LUMPECTOMY, RIGHT BREAST: ICD-10-CM

## 2019-11-15 RX ORDER — TAMOXIFEN CITRATE 20 MG/1
TABLET ORAL
Qty: 30 TABLET | Refills: 1 | Status: SHIPPED | OUTPATIENT
Start: 2019-11-15 | End: 2019-12-18 | Stop reason: SDUPTHER

## 2019-12-17 ENCOUNTER — HOSPITAL ENCOUNTER (OUTPATIENT)
Dept: WOMENS IMAGING | Age: 84
Discharge: HOME | End: 2019-12-17
Payer: MEDICARE

## 2019-12-17 ENCOUNTER — OFFICE VISIT (OUTPATIENT)
Dept: SURGERY | Age: 84
End: 2019-12-17
Payer: MEDICARE

## 2019-12-17 VITALS
HEART RATE: 100 BPM | DIASTOLIC BLOOD PRESSURE: 70 MMHG | BODY MASS INDEX: 19.51 KG/M2 | WEIGHT: 106 LBS | HEIGHT: 62 IN | SYSTOLIC BLOOD PRESSURE: 138 MMHG

## 2019-12-17 DIAGNOSIS — Z85.3 PERSONAL HISTORY OF MALIGNANT NEOPLASM OF BREAST: ICD-10-CM

## 2019-12-17 DIAGNOSIS — Z85.3 PERSONAL HISTORY OF MALIGNANT NEOPLASM OF BREAST: Primary | ICD-10-CM

## 2019-12-17 PROCEDURE — G8420 CALC BMI NORM PARAMETERS: HCPCS | Performed by: PHYSICIAN ASSISTANT

## 2019-12-17 PROCEDURE — 1036F TOBACCO NON-USER: CPT | Performed by: PHYSICIAN ASSISTANT

## 2019-12-17 PROCEDURE — 1090F PRES/ABSN URINE INCON ASSESS: CPT | Performed by: PHYSICIAN ASSISTANT

## 2019-12-17 PROCEDURE — G8427 DOCREV CUR MEDS BY ELIG CLIN: HCPCS | Performed by: PHYSICIAN ASSISTANT

## 2019-12-17 PROCEDURE — G8400 PT W/DXA NO RESULTS DOC: HCPCS | Performed by: PHYSICIAN ASSISTANT

## 2019-12-17 PROCEDURE — 1123F ACP DISCUSS/DSCN MKR DOCD: CPT | Performed by: PHYSICIAN ASSISTANT

## 2019-12-17 PROCEDURE — G8484 FLU IMMUNIZE NO ADMIN: HCPCS | Performed by: PHYSICIAN ASSISTANT

## 2019-12-17 PROCEDURE — G0279 TOMOSYNTHESIS, MAMMO: HCPCS

## 2019-12-17 PROCEDURE — 99213 OFFICE O/P EST LOW 20 MIN: CPT | Performed by: PHYSICIAN ASSISTANT

## 2019-12-17 PROCEDURE — 4040F PNEUMOC VAC/ADMIN/RCVD: CPT | Performed by: PHYSICIAN ASSISTANT

## 2019-12-18 ENCOUNTER — HOSPITAL ENCOUNTER (OUTPATIENT)
Dept: INFUSION THERAPY | Age: 84
Discharge: HOME | End: 2019-12-18
Payer: MEDICARE

## 2019-12-18 ENCOUNTER — OFFICE VISIT (OUTPATIENT)
Dept: HEMATOLOGY | Age: 84
End: 2019-12-18
Payer: MEDICARE

## 2019-12-18 VITALS
BODY MASS INDEX: 19.49 KG/M2 | WEIGHT: 105.9 LBS | OXYGEN SATURATION: 93 % | DIASTOLIC BLOOD PRESSURE: 60 MMHG | SYSTOLIC BLOOD PRESSURE: 104 MMHG | HEART RATE: 101 BPM | HEIGHT: 62 IN

## 2019-12-18 DIAGNOSIS — C50.211 MALIGNANT NEOPLASM OF UPPER-INNER QUADRANT OF RIGHT FEMALE BREAST, UNSPECIFIED ESTROGEN RECEPTOR STATUS (HCC): Primary | ICD-10-CM

## 2019-12-18 DIAGNOSIS — C50.211 MALIGNANT NEOPLASM OF UPPER-INNER QUADRANT OF RIGHT FEMALE BREAST, UNSPECIFIED ESTROGEN RECEPTOR STATUS (HCC): ICD-10-CM

## 2019-12-18 DIAGNOSIS — T38.6X5D ADVERSE EFFECT OF TAMOXIFEN, SUBSEQUENT ENCOUNTER: ICD-10-CM

## 2019-12-18 PROCEDURE — G8420 CALC BMI NORM PARAMETERS: HCPCS | Performed by: INTERNAL MEDICINE

## 2019-12-18 PROCEDURE — G8484 FLU IMMUNIZE NO ADMIN: HCPCS | Performed by: INTERNAL MEDICINE

## 2019-12-18 PROCEDURE — 85025 COMPLETE CBC W/AUTO DIFF WBC: CPT

## 2019-12-18 PROCEDURE — G8400 PT W/DXA NO RESULTS DOC: HCPCS | Performed by: INTERNAL MEDICINE

## 2019-12-18 PROCEDURE — 1123F ACP DISCUSS/DSCN MKR DOCD: CPT | Performed by: INTERNAL MEDICINE

## 2019-12-18 PROCEDURE — 1036F TOBACCO NON-USER: CPT | Performed by: INTERNAL MEDICINE

## 2019-12-18 PROCEDURE — 99213 OFFICE O/P EST LOW 20 MIN: CPT | Performed by: INTERNAL MEDICINE

## 2019-12-18 PROCEDURE — 1090F PRES/ABSN URINE INCON ASSESS: CPT | Performed by: INTERNAL MEDICINE

## 2019-12-18 PROCEDURE — 4040F PNEUMOC VAC/ADMIN/RCVD: CPT | Performed by: INTERNAL MEDICINE

## 2019-12-18 PROCEDURE — 99211 OFF/OP EST MAY X REQ PHY/QHP: CPT

## 2019-12-18 PROCEDURE — G8427 DOCREV CUR MEDS BY ELIG CLIN: HCPCS | Performed by: INTERNAL MEDICINE

## 2019-12-18 RX ORDER — TAMOXIFEN CITRATE 20 MG/1
20 TABLET ORAL DAILY
Qty: 30 TABLET | Refills: 12 | Status: SHIPPED | OUTPATIENT
Start: 2019-12-18 | End: 2020-02-03

## 2020-02-03 RX ORDER — TAMOXIFEN CITRATE 20 MG/1
TABLET ORAL
Qty: 30 TABLET | Refills: 1 | Status: SHIPPED | OUTPATIENT
Start: 2020-02-03 | End: 2020-04-22

## 2020-04-22 RX ORDER — TAMOXIFEN CITRATE 20 MG/1
TABLET ORAL
Qty: 30 TABLET | Refills: 1 | Status: SHIPPED | OUTPATIENT
Start: 2020-04-22 | End: 2020-08-04

## 2020-12-18 ENCOUNTER — HOSPITAL ENCOUNTER (OUTPATIENT)
Dept: INFUSION THERAPY | Age: 85
End: 2020-12-18
Payer: MEDICARE

## 2021-01-26 ENCOUNTER — TELEPHONE (OUTPATIENT)
Dept: SURGERY | Age: 86
End: 2021-01-26

## 2021-01-26 DIAGNOSIS — Z85.3 PERSONAL HISTORY OF MALIGNANT NEOPLASM OF BREAST: Primary | ICD-10-CM

## 2021-01-26 NOTE — TELEPHONE ENCOUNTER
Patient called in and stated that she needed to reschedule this appt. The orders are  when she wanted to reschedule and needs new ones to be added. Please call the patient with new appt times and date. She would prefer a Tuesday if possible.   Thank you   [phone]  CRISPIN Frankel

## 2021-02-23 ENCOUNTER — OFFICE VISIT (OUTPATIENT)
Dept: SURGERY | Age: 86
End: 2021-02-23
Payer: MEDICARE

## 2021-02-23 ENCOUNTER — HOSPITAL ENCOUNTER (OUTPATIENT)
Dept: WOMENS IMAGING | Age: 86
Discharge: HOME | End: 2021-02-23
Payer: MEDICARE

## 2021-02-23 VITALS
HEIGHT: 63 IN | SYSTOLIC BLOOD PRESSURE: 107 MMHG | HEART RATE: 67 BPM | DIASTOLIC BLOOD PRESSURE: 77 MMHG | BODY MASS INDEX: 16.83 KG/M2 | TEMPERATURE: 97.5 F | WEIGHT: 95 LBS

## 2021-02-23 DIAGNOSIS — Z12.31 VISIT FOR SCREENING MAMMOGRAM: Primary | ICD-10-CM

## 2021-02-23 DIAGNOSIS — Z85.3 PERSONAL HISTORY OF MALIGNANT NEOPLASM OF BREAST: ICD-10-CM

## 2021-02-23 PROCEDURE — 1090F PRES/ABSN URINE INCON ASSESS: CPT | Performed by: PHYSICIAN ASSISTANT

## 2021-02-23 PROCEDURE — 1123F ACP DISCUSS/DSCN MKR DOCD: CPT | Performed by: PHYSICIAN ASSISTANT

## 2021-02-23 PROCEDURE — 77066 DX MAMMO INCL CAD BI: CPT

## 2021-02-23 PROCEDURE — 99213 OFFICE O/P EST LOW 20 MIN: CPT | Performed by: PHYSICIAN ASSISTANT

## 2021-02-23 PROCEDURE — G8484 FLU IMMUNIZE NO ADMIN: HCPCS | Performed by: PHYSICIAN ASSISTANT

## 2021-02-23 PROCEDURE — 4040F PNEUMOC VAC/ADMIN/RCVD: CPT | Performed by: PHYSICIAN ASSISTANT

## 2021-02-23 PROCEDURE — G8419 CALC BMI OUT NRM PARAM NOF/U: HCPCS | Performed by: PHYSICIAN ASSISTANT

## 2021-02-23 PROCEDURE — 1036F TOBACCO NON-USER: CPT | Performed by: PHYSICIAN ASSISTANT

## 2021-02-23 PROCEDURE — G8427 DOCREV CUR MEDS BY ELIG CLIN: HCPCS | Performed by: PHYSICIAN ASSISTANT

## 2021-02-27 NOTE — PROGRESS NOTES
HPI:  Pritesh Arellano is in for yearly follow-up breast check. She has not noticed any changes in her breasts. She is s/p 12/2016 right lumpectomy and negative SNB for stage 1A intermediate grade invasive mammary carcinoma. She completed radiation and is on tamoxifen. EXAM: [facility] DIGITAL DIAGNOSTIC W OR WO CAD BILATERAL - 2/23/2021 8:11 AM   HISTORY: 3 year diagnostic follow-up in this 80year-old with history   of breast cancer. COMPARISON: 12/17/2019, 12/10/2018, 11/22/2017   FINDINGS:    Computer aided detection technology was utilized. 3-D tomosynthesis   views were obtained. Category D: The breast tissue is extremely dense,   which lowers the sensitivity of mammography. Typically benign calcifications are present. No suspicious mass,   calcifications, or architectural distortion. Stable posttreatment   change in the RIGHT breast.       Impression   1. No mammographic evidence of malignancy. Recommendation is for the   patient to return for routine mammography in one year or sooner, if   clinically indicated. 2. BI-RADS category 2: Benign findings        BREAST EXAM:  On examination, she has fibrocystic changes throughout both breasts, no dominant masses, no skin or nipple changes and no axillary adenopathy. The right lumpectomy site is well healed. There are appropriate post operative and post radiation changes. I see nothing suspicious for breast cancer. ASSESSMENT:  Benign fibrocystic changes              PLAN:  I will plan to see her back in 1 year for physical exam and bilateral mammograms. She will contact me if anything significant changes.